# Patient Record
Sex: MALE | Race: OTHER | NOT HISPANIC OR LATINO | URBAN - METROPOLITAN AREA
[De-identification: names, ages, dates, MRNs, and addresses within clinical notes are randomized per-mention and may not be internally consistent; named-entity substitution may affect disease eponyms.]

---

## 2017-07-25 ENCOUNTER — EMERGENCY (EMERGENCY)
Facility: HOSPITAL | Age: 22
LOS: 1 days | Discharge: HOME | End: 2017-07-25
Admitting: EMERGENCY MEDICINE
Payer: SELF-PAY

## 2017-07-25 VITALS
DIASTOLIC BLOOD PRESSURE: 77 MMHG | TEMPERATURE: 98 F | HEART RATE: 97 BPM | SYSTOLIC BLOOD PRESSURE: 146 MMHG | RESPIRATION RATE: 17 BRPM | OXYGEN SATURATION: 100 %

## 2017-07-25 DIAGNOSIS — R07.89 OTHER CHEST PAIN: ICD-10-CM

## 2017-07-25 LAB — D DIMER BLD IA.RAPID-MCNC: <150 NG/ML DDU — SIGNIFICANT CHANGE UP

## 2017-07-25 PROCEDURE — 93010 ELECTROCARDIOGRAM REPORT: CPT

## 2017-07-25 PROCEDURE — 99285 EMERGENCY DEPT VISIT HI MDM: CPT | Mod: 25

## 2017-07-25 RX ORDER — LIDOCAINE 4 G/100G
5 CREAM TOPICAL ONCE
Qty: 0 | Refills: 0 | Status: COMPLETED | OUTPATIENT
Start: 2017-07-25 | End: 2017-07-25

## 2017-07-25 RX ADMIN — Medication 30 MILLILITER(S): at 17:17

## 2017-07-25 RX ADMIN — LIDOCAINE 5 MILLILITER(S): 4 CREAM TOPICAL at 17:17

## 2017-07-25 NOTE — ED PROVIDER NOTE - MEDICAL DECISION MAKING DETAILS
Pt. with recent travel presenting with reproducible CP, sent from Ct md r/o pe. Pt. vss, EKG NSR no [id number], d-dimer neg, no clinical suspicion for pneumothorax, pneumonia , cardiac chest pain, suspect MS, pt. feeling better, re-assured not PE. advised to f/u with PMd or return to Er with worsening sx.

## 2017-07-25 NOTE — ED ADULT TRIAGE NOTE - CHIEF COMPLAINT QUOTE
pt [place] in [place] 3[place] ago after 3weeks of traveling on [employer] long flights. pt developed constant  midsternal sharp  chest pain 3days. denies sob or dizziness. sent here from urgent care to r/o pe

## 2017-07-25 NOTE — ED ADULT NURSE NOTE - CHPI ED SYMPTOMS NEG
no weakness/no fever/no dizziness/no chills/no numbness/no decreased eating/drinking/no tingling/no nausea/no vomiting

## 2017-07-25 NOTE — ED ADULT NURSE NOTE - CHIEF COMPLAINT QUOTE
[place] after 3[place] of traveling on [employer] long flights. pt developed constant  midsternal sharp  chest pain 3days. denies sob or dizziness. sent here from urgent care to r/o pe

## 2017-07-25 NOTE — ED PROVIDER NOTE - OBJECTIVE STATEMENT
Pt. with no PMH, has been traveling over course of past 3 wks, from [place]. arrived to [place] 3 days ago, since C/o ant chest wall pain. Pain is aching reproducible, worse with deep breath. pt. admits slept awkwardly over past few days. , pain not worse with exertion , no palpitations, denies taking anything for pain , no back pain , no weakness,  no associating with food, no cough, no fever/chills, non - smoker.

## 2017-07-25 NOTE — ED ADULT NURSE NOTE - OBJECTIVE STATEMENT
Patient is a 22 y/o male with no PMHX presents to the ED with sudden onset of midsternal CP starting today. Patient has been traveling for the past 3 weeks from [place] to [place], [place], [place], and [place]. Patient denies cigarette use, SOB, numbness/tingling, HA, drug use, recent surgery. Patient in NAD, speaking in full sentences, and will continue to monitor.

## 2021-10-06 NOTE — ED ADULT NURSE NOTE - CAS DISCH TRANSFER METHOD
Pt resting in bed, talking on the phone attempting to schedule follow up pcp visits Transportation service

## 2022-11-03 NOTE — ED ADULT NURSE NOTE - CAS ELECT INFOMATION PROVIDED
Xeljanz Counseling: I discussed with the patient the risks of Xeljanz therapy including increased risk of infection, liver issues, headache, diarrhea, or cold symptoms. Live vaccines should be avoided. They were instructed to call if they have any problems. DC instructions